# Patient Record
Sex: MALE | Race: WHITE
[De-identification: names, ages, dates, MRNs, and addresses within clinical notes are randomized per-mention and may not be internally consistent; named-entity substitution may affect disease eponyms.]

---

## 2020-02-11 ENCOUNTER — TRANSCRIBE ORDERS (OUTPATIENT)
Dept: OTHER | Age: 55
End: 2020-02-11

## 2020-02-11 DIAGNOSIS — Z13.6 SCREENING FOR CARDIOVASCULAR CONDITION: Primary | ICD-10-CM

## 2020-02-14 DIAGNOSIS — Z13.6 SCREENING FOR CARDIOVASCULAR CONDITION: Primary | ICD-10-CM

## 2020-02-20 ENCOUNTER — OFFICE VISIT (OUTPATIENT)
Dept: CARDIOLOGY | Facility: CLINIC | Age: 55
End: 2020-02-20
Payer: COMMERCIAL

## 2020-02-20 VITALS
OXYGEN SATURATION: 97 % | HEART RATE: 56 BPM | WEIGHT: 209.5 LBS | HEIGHT: 75 IN | BODY MASS INDEX: 26.05 KG/M2 | DIASTOLIC BLOOD PRESSURE: 75 MMHG | SYSTOLIC BLOOD PRESSURE: 122 MMHG

## 2020-02-20 DIAGNOSIS — Z13.6 SCREENING FOR CARDIOVASCULAR CONDITION: ICD-10-CM

## 2020-02-20 DIAGNOSIS — E78.5 HYPERLIPIDEMIA, UNSPECIFIED HYPERLIPIDEMIA TYPE: ICD-10-CM

## 2020-02-20 LAB
CHOLEST SERPL-MCNC: 225 MG/DL
CREAT UR-MCNC: 75 MG/DL
CRP SERPL HS-MCNC: 0.7 MG/L
FEF 25/75: NORMAL
FEV-1: NORMAL
FEV1/FVC: NORMAL
FVC: NORMAL
GLUCOSE SERPL-MCNC: 90 MG/DL (ref 70–99)
HDLC SERPL-MCNC: 60 MG/DL
INTERPRETATION ECG - MUSE: NORMAL
LDLC SERPL CALC-MCNC: 148 MG/DL
MICROALBUMIN UR-MCNC: <5 MG/L
MICROALBUMIN/CREAT UR: NORMAL MG/G CR (ref 0–17)
NONHDLC SERPL-MCNC: 164 MG/DL
NT-PROBNP SERPL-MCNC: 46 PG/ML (ref 0–125)
TRIGL SERPL-MCNC: 83 MG/DL

## 2020-02-20 RX ORDER — NICOTINE POLACRILEX 4 MG/1
20 GUM, CHEWING ORAL DAILY
COMMUNITY

## 2020-02-20 RX ORDER — TADALAFIL 5 MG/1
1 TABLET ORAL DAILY
COMMUNITY
Start: 2020-02-11

## 2020-02-20 ASSESSMENT — MIFFLIN-ST. JEOR: SCORE: 1885.92

## 2020-02-20 NOTE — LETTER
"2/20/2020      RE: Armando Baltazar  [address]  [place] [address]       Dear Colleague,    Thank you for the opportunity to participate in the care of your patient, Armando Baltazar, at the [facility]ARDIOVASC[facility] DISEASE PREVENTION at [facility]. Please see a copy of my visit note below.    [facility] Cardiovascular Disease Prevention - Exam Note    Active Problems   Patient Active Problem List    Diagnosis Date Noted     Hyperlipidemia 01/01/2013     Priority: Medium       Reason For Visit   Patient here for [facility] early detection of atherosclerosis and CVD exam.    Pain Evaluation  Current history of pain associated with this visit is: denied    HPI   Armando Baltazar is a 54 year old year old male with a history of hyperlipidemia onset in 2013 with increasing levels in the past few years.  He has an occasional left pectoral area twinge lasting a few seconds for about 10 years not associated with exertion about twice a year.     He feels generally well with no chest pain or shortness of breath with vigorous exercise.  He has had some low glucose levels in the 60's but does not recall symptoms associated with this. No elevated glucose levels in his EMR. He had mild  elevated ALT in 2017 and 2016, normal in 2019. Creatinine was increased in 2013 at 1.26 but otherwise normal.     Nutrition assessment per patient report:   We discussed \"my plate\" and Mediterranean eating patterns and literature was provided.  Foods with fat/cholesterol (fried foods, fatty meats, junk food):  2 servings per month , no red meat  Fruits and vegetables (  cup cooked, 1 cup raw):  3 servings per day, both  Caffeine (1 cup coffee, soda, etc):  2 servings per day, coffee cream  Alcohol servings (12 oz. beer, 4 oz. wine, 1  oz. in mixed drink):  2 servings per day, wine or beer.  Calcium servings (dairy foods, 8 oz. milk, yogurt, cheese, ice cream):  1-2 per " day  Salt/sodium use:  moderate  Special dietary habits:  meat minimalist    Activity  Patient is active 2-3  times per week for 30 or more minutes with walking, running and uses an eliptical or runs outside about 3 miles for 30 minutes. We discussed targeting 150 minutes per week of cardio by adding on more walking.  Light strength training upper body 1-2 times per week recommended.    Laboratory Results Review  We discussed laboratory results today including lipids targets and how foods influence cholesterol.    Weight  His perceived healthy weight is 200 pounds.  A normal BMI of 25 is equal to 200 pounds.  The current BMI of 26.19 is overweight range.  A weight reduction speed of 2-3 lbs per month for men is recommended.    PMH   Past Medical History:   Diagnosis Date     Erectile dysfunction      GERD (gastroesophageal reflux disease) 2016     Hyperlipidemia 2013       PSH  Past Surgical History:   Procedure Laterality Date     AS REPAIR CRUCIATE LIGAMENT,KNEE Left 1994     C REPAIR CRUCIATE LIGAMENT,KNEE Left 2010    second surgery     HERNIA REPAIR Left 1977       Current Meds   Current Outpatient Medications   Medication Sig Dispense Refill     omeprazole 20 MG PO tablet Take 20 mg by mouth daily       tadalafil 5 MG PO tablet Take 1 tablet by mouth daily         Allergies    No Known Allergies    Family Hx   Family History   Problem Relation Age of Onset     Lymphoma Mother      Rheumatoid Arthritis Mother      Hypertension Mother      No Known Problems Father      No Known Problems Sister      Coronary Artery Disease Maternal Grandfather 63        smoker     Pancreatic Cancer Paternal Grandmother      Coronary Artery Disease Paternal Grandfather 64        smoker       Social History  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  He is single with son age 20 and daughter age 17..     Enjoyment of life is 8 with 10 enjoys life fully.    Tobacco History  History  "  Smoking Status     Not on file   Smokeless Tobacco     Not on file       ROS  CONSTITUTIONAL:  No fever, chills, or sweats. No weight gain/loss.   EENT:  No visual disturbance, ear ache, epistaxis, sore throat  ALLERGIES/IMMUNOLOGIC:  Negative  RESPIRATORY:  No cough, hemoptysis  CARDIOVASCULAR:  As per HPI  GI:  No nausea, vomiting, hematemesis, melena  :  No urinary frequency, dysuria, or hematuria  INTEGUMENT:  Negative  PSYCHIATRIC:  Negative  NEURO:  Negative  ENDOCRINE:  Negative  MUSCULOSKELETAL:  Negative     Vital Signs   /75 (BP Location: Left arm, Patient Position: Sitting, Cuff Size: Adult Regular)   Pulse 56   Ht 1.905 m (6' 3\")   Wt 95 kg (209 lb 8 oz)   SpO2 97%   BMI 26.19 kg/m        Waist: 39.5 inches  Hip: 42 inches    Physical Exam   In general, the patient is a pleasant male in no apparent distress.    HEENT: NC/AT.  PERRLA.  EOMI.  Sclerae white, not injected.  Nares clear.  Pharynx without erythema or exudate.  Dentition intact.    Neck: No adenopathy.  No thyromegaly.Carotids +4/4 bilaterally without bruits.  No jugular venous distension.   Lungs: CTA.  No ronchi, wheezes, rales.     Cor: RRR. Normal S1, S2 splits physiologically. No murmur, rub, click, or gallop. The PMI is in the 5th ICS in the midclavicular line. There is no heave.   Abdomen: Soft, nontender, nondistended. No organomegaly.  No bruits.   Extremities: No clubbing, cyanosis, or edema. The pulses are +2/2 at the post-tibial sites bilaterally. No bruits are noted.    Recent Labs  Lab Results   Component Value Date    GLC 90 02/20/2020      Lab Results   Component Value Date    NTBNP 46 02/20/2020     No results found for: NTBNPI   Lab Results   Component Value Date    UCRR 75 02/20/2020      Lab Results   Component Value Date    MICROL <5 02/20/2020      No results found for: MICROALBUMIN   No results found for: CRP   Lab Results   Component Value Date    CHOL 225 (H) 02/20/2020      Lab Results   Component Value " Date    TRIG 83 02/20/2020      Lab Results   Component Value Date    HDL 60 02/20/2020      Lab Results   Component Value Date     (H) 02/20/2020      No results found for: VLDL   No results found for: CHOLHDLRATIO  Lab Results   Component Value Date    NHDL 164 (H) 02/20/2020              Blanca Test Results    BASIC SPIROMETRY: Summary of two attempts (see printout for details of results)  Results Estimated range for ht/age   FVC: 5.65 liter FVC: 4.81-6.95 liter   FEV1: 4.18 liter FEV1: 3.62-5.43 liter     History of asthma:  NO   History of respiratory infection current/recent: {NO     Spirometry Results:  normal      WALKING BLOOD PRESSURE RESPONSE (3 minute, 5 MET level walk)   Pre BP: 90/70 mmHg  3 min BP: 122/60 mmHg  1 min post BP: 110/72 mmHg    Pre HR: 60 bpm  3 min HR: 96 bpm  1 min post HR: 52 bpm       RETINAL VASCULAR ASSESSMENT   Left Eye Abnormality:  none  AV Ratio: 0.78    Right Eye Abnormality:  none  AV Ratio: 0.80     Retinal Assessment:  normal      ABDOMINAL AORTA ULTRASOUND (< 2.5 normal, borderline 2.5-2.9, abnormal > 3)   SupraIliac 2.02 cm    SupraRenal 1.93 cm    InfraRenal Proximal 2.02 cm    InfraRenal Distal 2.21 cm      Abdominal Aorta Assessment:  normal      LEFT VENTRICULAR ULTRASOUND MEASUREMENTS (adjusted for BSA)  LVIDD 50.5 mm   Septa 9.4 mm   Posterior 10.9 mm     Left Ventricular US Assessment:  normal      Carotid Artery IMT measurements report and plaques in the small area examined:   Left IMT 0.668 mm  Plaques mild plaque formation in left bulb area size 2.0 mm.     Right IMT 0.624 mm  Plaques none       ECG (see tracing):  normal sinus rhythm;  rate: 56 bpm      Arterial Elasticity per age and gender (see printout):   C1 24.4 mL/mmHg x 10  normal   C2 6.8 mL/mmHg x 100 normal   Supine blood pressure: 124/77 mmHg       Assessment:     Cardiovascular:  ECG normal sinus 54. Nt pro BNP is normal. No report of symptoms of sleep apnea. Chest twinge a couple of time a  year for a few seconds atypical for angina. No shortness of breath, palpitations or dizziness. He has GERD symptoms associated with food or alcohol intake later in the day. He takes daily omeprazole which helps. Could consider a stool sample for H pylori since his symptoms came on abruptly. Both PGF and MGF with CAD 60's but were smokers. Small non obstructive plaques with carotid IMT. He is interested in obtaining a CAC scan. With his family history and his elevated LDL would be helpful information.     Blood Pressure:  No hx of HTN, sitting /75, 124/76 ACC elevated range.     Lipids:  LDL today at 148 a little lower than 165 mg/dl one year ago. HDL above average at 60, normal triglyceride. Some low glucose levels in the 60's but no indication of insulin resistance with elevated levels or symptoms of hypoglycemia. Transient mild elevation of ALT up to 57 in 2016. He consumes 2 alcohol beverages per day.     Health Habit Summary:  Nutrition: Heart Healthy Eating:  most of the time   Exercise:  regularly active  Weight:  overweight range  Tobacco Use:  never used    Full report to follow prevention team review of test results with scanned final report.    Time spent for patient visit was 60 minutes with more than half the time spent on counseling and coordination of care.    JOE Pascal CNP       CC  Patient Care Team:  Panda Cui as PCP - General (Internal Medicine)  SELF, REFERRED

## 2020-02-20 NOTE — PROGRESS NOTES
"[facility] Cardi[facility] Disease Prevention - Exam Note    Active Problems   Patient Active Problem List    Diagnosis Date Noted     Hyperlipidemia 01/01/2013     Priority: Medium       Reason For Visit   Patient here for [facility] early detection of atherosclerosis and CVD exam.    Pain Evaluation  Current history of pain associated with this visit is: denied    HPI   Armando Baltazar is a 54 year old year old male with a history of hyperlipidemia onset in 2013 with increasing levels in the past few years.  He has an occasional left pectoral area twinge lasting a few seconds for about 10 years not associated with exertion about twice a year.     He feels generally well with no chest pain or shortness of breath with vigorous exercise.  He has had some low glucose levels in the 60's but does not recall symptoms associated with this. No elevated glucose levels in his EMR. He had mild  elevated ALT in 2017 and 2016, normal in 2019. Creatinine was increased in 2013 at 1.26 but otherwise normal.     Nutrition assessment per patient report:   We discussed \"my plate\" and Mediterranean eating patterns and literature was provided.  Foods with fat/cholesterol (fried foods, fatty meats, junk food):  2 servings per month , no red meat  Fruits and vegetables (  cup cooked, 1 cup raw):  3 servings per day, both  Caffeine (1 cup coffee, soda, etc):  2 servings per day, coffee cream  Alcohol servings (12 oz. beer, 4 oz. wine, 1  oz. in mixed drink):  2 servings per day, wine or beer.  Calcium servings (dairy foods, 8 oz. milk, yogurt, cheese, ice cream):  1-2 per day  Salt/sodium use:  moderate  Special dietary habits:  meat minimalist    Activity  Patient is active 2-3  times per week for 30 or more minutes with walking, running and uses an eliptical or runs outside about 3 miles for 30 minutes. We discussed targeting 150 minutes per week of cardio by adding on more walking.  Light strength training upper body 1-2 times " per week recommended.    Laboratory Results Review  We discussed laboratory results today including lipids targets and how foods influence cholesterol.    Weight  His perceived healthy weight is 200 pounds.  A normal BMI of 25 is equal to 200 pounds.  The current BMI of 26.19 is overweight range.  A weight reduction speed of 2-3 lbs per month for men is recommended.    PMH   Past Medical History:   Diagnosis Date     Erectile dysfunction      GERD (gastroesophageal reflux disease) 2016     Hyperlipidemia 2013       PSH  Past Surgical History:   Procedure Laterality Date     AS REPAIR CRUCIATE LIGAMENT,KNEE Left 1994     C REPAIR CRUCIATE LIGAMENT,KNEE Left 2010    second surgery     HERNIA REPAIR Left 1977       Current Meds   Current Outpatient Medications   Medication Sig Dispense Refill     omeprazole 20 MG PO tablet Take 20 mg by mouth daily       tadalafil 5 MG PO tablet Take 1 tablet by mouth daily         Allergies    No Known Allergies    Family Hx   Family History   Problem Relation Age of Onset     Lymphoma Mother      Rheumatoid Arthritis Mother      Hypertension Mother      No Known Problems Father      No Known Problems Sister      Coronary Artery Disease Maternal Grandfather 63        smoker     Pancreatic Cancer Paternal Grandmother      Coronary Artery Disease Paternal Grandfather 64        smoker       Social History  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  He is single with son age 20 and daughter age 17..     Enjoyment of life is 8 with 10 enjoys life fully.    Tobacco History  History   Smoking Status     Not on file   Smokeless Tobacco     Not on file       ROS  CONSTITUTIONAL:  No fever, chills, or sweats. No weight gain/loss.   EENT:  No visual disturbance, ear ache, epistaxis, sore throat  ALLERGIES/IMMUNOLOGIC:  Negative  RESPIRATORY:  No cough, hemoptysis  CARDIOVASCULAR:  As per HPI  GI:  No nausea, vomiting, hematemesis, melena  :  No  "urinary frequency, dysuria, or hematuria  INTEGUMENT:  Negative  PSYCHIATRIC:  Negative  NEURO:  Negative  ENDOCRINE:  Negative  MUSCULOSKELETAL:  Negative     Vital Signs   /75 (BP Location: Left arm, Patient Position: Sitting, Cuff Size: Adult Regular)   Pulse 56   Ht 1.905 m (6' 3\")   Wt 95 kg (209 lb 8 oz)   SpO2 97%   BMI 26.19 kg/m        Waist: 39.5 inches  Hip: 42 inches    Physical Exam   In general, the patient is a pleasant male in no apparent distress.    HEENT: NC/AT.  PERRLA.  EOMI.  Sclerae white, not injected.  Nares clear.  Pharynx without erythema or exudate.  Dentition intact.    Neck: No adenopathy.  No thyromegaly.Carotids +4/4 bilaterally without bruits.  No jugular venous distension.   Lungs: CTA.  No ronchi, wheezes, rales.     Cor: RRR. Normal S1, S2 splits physiologically. No murmur, rub, click, or gallop. The PMI is in the 5th ICS in the midclavicular line. There is no heave.   Abdomen: Soft, nontender, nondistended. No organomegaly.  No bruits.   Extremities: No clubbing, cyanosis, or edema. The pulses are +2/2 at the post-tibial sites bilaterally. No bruits are noted.    Recent Labs  Lab Results   Component Value Date    GLC 90 02/20/2020      Lab Results   Component Value Date    NTBNP 46 02/20/2020     No results found for: NTBNPI   Lab Results   Component Value Date    UCRR 75 02/20/2020      Lab Results   Component Value Date    MICROL <5 02/20/2020      No results found for: MICROALBUMIN   No results found for: CRP   Lab Results   Component Value Date    CHOL 225 (H) 02/20/2020      Lab Results   Component Value Date    TRIG 83 02/20/2020      Lab Results   Component Value Date    HDL 60 02/20/2020      Lab Results   Component Value Date     (H) 02/20/2020      No results found for: VLDL   No results found for: CHOLHDLRATIO  Lab Results   Component Value Date    NHDL 164 (H) 02/20/2020              Blanca Test Results    BASIC SPIROMETRY: Summary of two attempts " (see printout for details of results)  Results Estimated range for ht/age   FVC: 5.65 liter FVC: 4.81-6.95 liter   FEV1: 4.18 liter FEV1: 3.62-5.43 liter     History of asthma:  NO   History of respiratory infection current/recent: {NO     Spirometry Results:  normal      WALKING BLOOD PRESSURE RESPONSE (3 minute, 5 MET level walk)   Pre BP: 90/70 mmHg  3 min BP: 122/60 mmHg  1 min post BP: 110/72 mmHg    Pre HR: 60 bpm  3 min HR: 96 bpm  1 min post HR: 52 bpm       RETINAL VASCULAR ASSESSMENT   Left Eye Abnormality:  none  AV Ratio: 0.78    Right Eye Abnormality:  none  AV Ratio: 0.80     Retinal Assessment:  normal      ABDOMINAL AORTA ULTRASOUND (< 2.5 normal, borderline 2.5-2.9, abnormal > 3)   SupraIliac 2.02 cm    SupraRenal 1.93 cm    InfraRenal Proximal 2.02 cm    InfraRenal Distal 2.21 cm      Abdominal Aorta Assessment:  normal      LEFT VENTRICULAR ULTRASOUND MEASUREMENTS (adjusted for BSA)  LVIDD 50.5 mm   Septa 9.4 mm   Posterior 10.9 mm     Left Ventricular US Assessment:  normal      Carotid Artery IMT measurements report and plaques in the small area examined:   Left IMT 0.668 mm  Plaques mild plaque formation in left bulb area size 2.0 mm.     Right IMT 0.624 mm  Plaques none       ECG (see tracing):  normal sinus rhythm;  rate: 56 bpm      Arterial Elasticity per age and gender (see printout):   C1 24.4 mL/mmHg x 10  normal   C2 6.8 mL/mmHg x 100 normal   Supine blood pressure: 124/77 mmHg       Assessment:     Cardiovascular:  ECG normal sinus 54. Nt pro BNP is normal. No report of symptoms of sleep apnea. Chest twinge a couple of time a year for a few seconds atypical for angina. No shortness of breath, palpitations or dizziness. He has GERD symptoms associated with food or alcohol intake later in the day. He takes daily omeprazole which helps. Could consider a stool sample for H pylori since his symptoms came on abruptly. Both PGF and MGF with CAD 60's but were smokers. Small non obstructive  plaques with carotid IMT. He is interested in obtaining a CAC scan. With his family history and his elevated LDL would be helpful information.     Blood Pressure:  No hx of HTN, sitting /75, 124/76 ACC elevated range.     Lipids:  LDL today at 148 a little lower than 165 mg/dl one year ago. HDL above average at 60, normal triglyceride. Some low glucose levels in the 60's but no indication of insulin resistance with elevated levels or symptoms of hypoglycemia. Transient mild elevation of ALT up to 57 in 2016. He consumes 2 alcohol beverages per day.     Health Habit Summary:  Nutrition: Heart Healthy Eating:  most of the time   Exercise:  regularly active  Weight:  overweight range  Tobacco Use:  never used    Full report to follow prevention team review of test results with scanned final report.    Time spent for patient visit was 60 minutes with more than half the time spent on counseling and coordination of care.    JOE Pascal CNP       CC  Patient Care Team:  Panda Cui as PCP - General (Internal Medicine)  SELF, REFERRED

## 2020-03-10 ENCOUNTER — HOSPITAL ENCOUNTER (OUTPATIENT)
Dept: CT IMAGING | Facility: CLINIC | Age: 55
Discharge: HOME | End: 2020-03-10
Attending: NURSE PRACTITIONER | Admitting: NURSE PRACTITIONER
Payer: COMMERCIAL

## 2020-03-10 DIAGNOSIS — E78.5 HYPERLIPIDEMIA, UNSPECIFIED HYPERLIPIDEMIA TYPE: ICD-10-CM

## 2020-03-10 PROCEDURE — 75571 CT HRT W/O DYE W/CA TEST: CPT | Mod: 26 | Performed by: INTERNAL MEDICINE

## 2020-03-10 PROCEDURE — 75571 CT HRT W/O DYE W/CA TEST: CPT

## 2020-11-07 ENCOUNTER — HEALTH MAINTENANCE LETTER (OUTPATIENT)
Age: 55
End: 2020-11-07

## 2020-11-16 ENCOUNTER — MEDICAL CORRESPONDENCE (OUTPATIENT)
Dept: HEALTH INFORMATION MANAGEMENT | Facility: CLINIC | Age: 55
End: 2020-11-16

## 2021-09-05 ENCOUNTER — HEALTH MAINTENANCE LETTER (OUTPATIENT)
Age: 56
End: 2021-09-05

## 2021-12-26 ENCOUNTER — HEALTH MAINTENANCE LETTER (OUTPATIENT)
Age: 56
End: 2021-12-26

## 2022-10-23 ENCOUNTER — HEALTH MAINTENANCE LETTER (OUTPATIENT)
Age: 57
End: 2022-10-23

## 2023-04-02 ENCOUNTER — HEALTH MAINTENANCE LETTER (OUTPATIENT)
Age: 58
End: 2023-04-02

## 2024-06-08 ENCOUNTER — HEALTH MAINTENANCE LETTER (OUTPATIENT)
Age: 59
End: 2024-06-08